# Patient Record
Sex: MALE | Race: WHITE
[De-identification: names, ages, dates, MRNs, and addresses within clinical notes are randomized per-mention and may not be internally consistent; named-entity substitution may affect disease eponyms.]

---

## 2020-08-03 ENCOUNTER — HOSPITAL ENCOUNTER (EMERGENCY)
Dept: HOSPITAL 11 - JP.ED | Age: 40
Discharge: OTHER INSTITUTION | End: 2020-08-03
Payer: COMMERCIAL

## 2020-08-03 DIAGNOSIS — K37: Primary | ICD-10-CM

## 2020-08-03 PROCEDURE — 96361 HYDRATE IV INFUSION ADD-ON: CPT

## 2020-08-03 PROCEDURE — 83690 ASSAY OF LIPASE: CPT

## 2020-08-03 PROCEDURE — 83605 ASSAY OF LACTIC ACID: CPT

## 2020-08-03 PROCEDURE — 85610 PROTHROMBIN TIME: CPT

## 2020-08-03 PROCEDURE — 96365 THER/PROPH/DIAG IV INF INIT: CPT

## 2020-08-03 PROCEDURE — 96376 TX/PRO/DX INJ SAME DRUG ADON: CPT

## 2020-08-03 PROCEDURE — 80053 COMPREHEN METABOLIC PANEL: CPT

## 2020-08-03 PROCEDURE — 96375 TX/PRO/DX INJ NEW DRUG ADDON: CPT

## 2020-08-03 PROCEDURE — 85027 COMPLETE CBC AUTOMATED: CPT

## 2020-08-03 PROCEDURE — 81001 URINALYSIS AUTO W/SCOPE: CPT

## 2020-08-03 PROCEDURE — 74177 CT ABD & PELVIS W/CONTRAST: CPT

## 2020-08-03 PROCEDURE — 36415 COLL VENOUS BLD VENIPUNCTURE: CPT

## 2020-08-03 PROCEDURE — 86901 BLOOD TYPING SEROLOGIC RH(D): CPT

## 2020-08-03 PROCEDURE — 86850 RBC ANTIBODY SCREEN: CPT

## 2020-08-03 PROCEDURE — 99285 EMERGENCY DEPT VISIT HI MDM: CPT

## 2020-08-03 PROCEDURE — 86900 BLOOD TYPING SEROLOGIC ABO: CPT

## 2020-08-03 PROCEDURE — 85651 RBC SED RATE NONAUTOMATED: CPT

## 2020-08-03 NOTE — CRLCT
INDICATION:



Ulcerative colitis, right lower quadrant pain  



TECHNIQUE:



CT abdomen and pelvis acquired with IV contrast. 124 cc Isovue-300 



COMPARISON:



None 



FINDINGS:



Lower chest: Unremarkable.  



Liver: Unremarkable.  



Spleen: Splenomegaly. 



Pancreas: Unremarkable.  



Gallbladder and bile ducts: Unremarkable.  



Kidneys: Unremarkable.  



Adrenal glands: Unremarkable.  



GI tract: Diffuse colonic fecal retention. The appendix is dilated up to 

8-9 millimeters, thick-walled and fluid-filled with adjacent inflammatory 

stranding. Findings consistent with acute appendicitis. 



Vascular structures: Unremarkable.  



Lymph nodes: Unremarkable.  



Miscellaneous: Unremarkable.  No free air or significant free fluid.  



Pelvic Organs: Unremarkable.  



Bones: Unremarkable for age.  



IMPRESSION:



Findings consistent with appendicitis. 



Splenomegaly.



Dictated by Jeyson Marley MD @ 8/3/2020 2:26:46 AM



Please note that all CT scans at this facility use dose modulation, 

iterative reconstruction, and/or weight-based dosing when appropriate to 

reduce radiation dose to as low as reasonably achievable.



Dictated by: Jeyson Marley MD @ 08/03/2020 02:27:01



(Electronically Signed)

## 2020-08-03 NOTE — EDM.PDOC
ED HPI GENERAL MEDICAL PROBLEM





- General


Chief Complaint: Abdominal Pain


Stated Complaint: ABD PAIN


Time Seen by Provider: 08/03/20 00:42


Source of Information: Reports: Patient


History Limitations: Reports: No Limitations





- History of Present Illness


Onset Date: 08/03/20


Onset Time: 18:00


Duration: Getting Worse


Quality: Reports: Stabbing (Right lower quadrant), Other (States this is nothing

like his usual ulcerative colitis pain)


Severity: Severe


  ** Right Lower Abdominal


Pain Score (Numeric/FACES): 8





- Related Data


                                    Allergies











Allergy/AdvReac Type Severity Reaction Status Date / Time


 


No Known Allergies Allergy   Verified 08/03/20 00:54











Home Meds: 


                                    Home Meds





NK [No Known Home Meds]  08/03/20 [History]











Past Medical History


Gastrointestinal History: Reports: Inflammatory Bowel Disease (Ulcerative 

colitis)





Social & Family History





- Living Situation & Occupation


Living situation: Reports:  (Here on vacation staying in a cabin with his

 wife and children.)





ED ROS GENERAL





- Review of Systems


Review Of Systems: See Below


Constitutional: Reports: Chills, Diaphoresis, Decreased Appetite


HEENT: Reports: No Symptoms


Respiratory: Reports: No Symptoms


Cardiovascular: Reports: No Symptoms


GI/Abdominal: Reports: Abdominal Pain, Bloody Stool, Diarrhea, Decreased 

Appetite, Nausea, Vomiting


Musculoskeletal: Reports: No Symptoms


Skin: Reports: No Symptoms





ED EXAM, GI/ABD





- Physical Exam


Exam: See Below


Text/Narrative:: 





Appears quite pale initially


Exam Limited By: No Limitations


General Appearance: Alert, WD/WN, Moderate Distress, Other (Observed to walk 

into the emergency department completely erect and not bent over in any 

distress)


Eyes: Bilateral: Normal Appearance, Pale Conjunctiva


Ears: Normal External Exam


Nose: Normal Inspection


Throat/Mouth: Normal Teeth, Normal Voice


Head: Atraumatic, Normocephalic


Neck: Normal Inspection, Supple


Respiratory/Chest: No Respiratory Distress, Lungs Clear


Cardiovascular: Normal Peripheral Pulses, Regular Rate, Rhythm, No Edema


GI/Abdominal Exam: No Organomegaly, No Distention, No Mass, Guarding (Right 

lower quadrant), Tender, Abnormal Bowel Sounds (Quiet bowel sounds)





Course





- Vital Signs


Text/Narrative:: 





Initial differential diagnosis: Ulcerative colitis flare, perforation, bowel 

obstruction, appendicitis, anemia.  The patient is initially treated with 1 L IV

 fluid bolus, Dilaudid, and Zofran. Notified at 0130 by nursing staff that 

Hgb=6+. @ 0233 we are in reciept of CT interpretation which shows acute 

appendicitis, no free air or free fluid.  At 0253 I discussed the patient with 

Dr. Condon from [facility] in [place].  He is excepted the patient in transfer there.








Last Recorded V/S: 


                                Last Vital Signs











Temp  36.7 C   08/03/20 01:01


 


Pulse  81   08/03/20 02:13


 


Resp  17   08/03/20 02:13


 


BP  134/65   08/03/20 02:13


 


Pulse Ox  100   08/03/20 02:13














- Orders/Labs/Meds


Orders: 


                               Active Orders 24 hr











 Category Date Time Status


 


 ANTIBODY IDENTIFICATION [BBK] Stat Lab  08/03/20 01:25 Results


 


 PATIENT RETYPE [BBK] Stat Lab  08/03/20 01:25 Results


 


 TYPE AND SCREEN [BBK] Stat Lab  08/03/20 01:25 Results


 


 UA W/MICROSCOPIC [URIN] Urgent Lab  08/03/20 01:13 Ordered


 


 Ampicillin/Sulbactam Na [Unasyn] 3 gm Med  08/03/20 02:46 Active





 Sodium Chloride 0.9% [Normal Saline] 100 ml   





 IV ONETIME   


 


 Sodium Chloride 0.9% [Normal Saline] 1,000 ml Med  08/03/20 02:45 Active





 IV ASDIRECTED   








                                Medication Orders





Sodium Chloride (Normal Saline)  1,000 mls @ 500 mls/hr IV ASDIRECTED EMMA


   Last Admin: 08/03/20 02:38  Dose: 500 mls/hr


   Documented by: VEGA


Ampicillin Sodium/Sulbactam (Sodium 3 gm/ Sodium Chloride)  100 mls @ 200 mls/hr

IV ONETIME ONE


   Stop: 08/03/20 03:15








Labs: 


                                Laboratory Tests











  08/03/20 08/03/20 08/03/20 Range/Units





  01:25 01:25 01:25 


 


WBC  16.4 H    (4.5-11.0)  K/uL


 


RBC  1.91 L    (4.30-5.90)  M/uL


 


Hgb  6.9 L*    (12.0-15.0)  g/dL


 


Hct  24.0 L    (40.0-54.0)  %


 


MCV  126 H    (80-98)  fL


 


MCH  36 H    (27-31)  pg


 


MCHC  29 L    (32-36)  %


 


Plt Count  351    (150-400)  K/uL


 


ESR     (0-20)  mm/hr


 


PT     (9.5-12.0)  sec


 


INR     (0.80-1.20)  


 


Sodium   137 L   (140-148)  mmol/L


 


Potassium   4.0   (3.6-5.2)  mmol/L


 


Chloride   101   (100-108)  mmol/L


 


Carbon Dioxide   21   (21-32)  mmol/L


 


Anion Gap   19.0 H   (5.0-14.0)  mmol/L


 


BUN   16   (7-18)  mg/dL


 


Creatinine   1.0   (0.8-1.3)  mg/dL


 


Est Cr Clr Drug Dosing   117.59   mL/min


 


Estimated GFR (MDRD)   > 60   (>60)  


 


Glucose   114 H   ()  mg/dL


 


Lactic Acid    1.7  (0.4-2.0)  mmol/L


 


Calcium   8.8   (8.5-10.1)  mg/dL


 


Total Bilirubin   4.7 H   (0.2-1.0)  mg/dL


 


AST   84 H   (15-37)  U/L


 


ALT   30   (12-78)  U/L


 


Alkaline Phosphatase   39 L   ()  U/L


 


Total Protein   7.6   (6.4-8.2)  g/dL


 


Albumin   4.5   (3.4-5.0)  g/dL


 


Globulin   3.1   (2.3-3.5)  g/dL


 


Albumin/Globulin Ratio   1.5   (1.2-2.2)  


 


Lipase     ()  U/L


 


Blood Type     


 


Gel Antibody Screen     














  08/03/20 08/03/20 08/03/20 Range/Units





  01:25 01:25 01:25 


 


WBC     (4.5-11.0)  K/uL


 


RBC     (4.30-5.90)  M/uL


 


Hgb     (12.0-15.0)  g/dL


 


Hct     (40.0-54.0)  %


 


MCV     (80-98)  fL


 


MCH     (27-31)  pg


 


MCHC     (32-36)  %


 


Plt Count     (150-400)  K/uL


 


ESR    108 H  (0-20)  mm/hr


 


PT   11.1   (9.5-12.0)  sec


 


INR   1.02   (0.80-1.20)  


 


Sodium     (140-148)  mmol/L


 


Potassium     (3.6-5.2)  mmol/L


 


Chloride     (100-108)  mmol/L


 


Carbon Dioxide     (21-32)  mmol/L


 


Anion Gap     (5.0-14.0)  mmol/L


 


BUN     (7-18)  mg/dL


 


Creatinine     (0.8-1.3)  mg/dL


 


Est Cr Clr Drug Dosing     mL/min


 


Estimated GFR (MDRD)     (>60)  


 


Glucose     ()  mg/dL


 


Lactic Acid     (0.4-2.0)  mmol/L


 


Calcium     (8.5-10.1)  mg/dL


 


Total Bilirubin     (0.2-1.0)  mg/dL


 


AST     (15-37)  U/L


 


ALT     (12-78)  U/L


 


Alkaline Phosphatase     ()  U/L


 


Total Protein     (6.4-8.2)  g/dL


 


Albumin     (3.4-5.0)  g/dL


 


Globulin     (2.3-3.5)  g/dL


 


Albumin/Globulin Ratio     (1.2-2.2)  


 


Lipase  131    ()  U/L


 


Blood Type     


 


Gel Antibody Screen     














  08/03/20 Range/Units





  01:25 


 


WBC   (4.5-11.0)  K/uL


 


RBC   (4.30-5.90)  M/uL


 


Hgb   (12.0-15.0)  g/dL


 


Hct   (40.0-54.0)  %


 


MCV   (80-98)  fL


 


MCH   (27-31)  pg


 


MCHC   (32-36)  %


 


Plt Count   (150-400)  K/uL


 


ESR   (0-20)  mm/hr


 


PT   (9.5-12.0)  sec


 


INR   (0.80-1.20)  


 


Sodium   (140-148)  mmol/L


 


Potassium   (3.6-5.2)  mmol/L


 


Chloride   (100-108)  mmol/L


 


Carbon Dioxide   (21-32)  mmol/L


 


Anion Gap   (5.0-14.0)  mmol/L


 


BUN   (7-18)  mg/dL


 


Creatinine   (0.8-1.3)  mg/dL


 


Est Cr Clr Drug Dosing   mL/min


 


Estimated GFR (MDRD)   (>60)  


 


Glucose   ()  mg/dL


 


Lactic Acid   (0.4-2.0)  mmol/L


 


Calcium   (8.5-10.1)  mg/dL


 


Total Bilirubin   (0.2-1.0)  mg/dL


 


AST   (15-37)  U/L


 


ALT   (12-78)  U/L


 


Alkaline Phosphatase   ()  U/L


 


Total Protein   (6.4-8.2)  g/dL


 


Albumin   (3.4-5.0)  g/dL


 


Globulin   (2.3-3.5)  g/dL


 


Albumin/Globulin Ratio   (1.2-2.2)  


 


Lipase   ()  U/L


 


Blood Type  B POSITIVE  


 


Gel Antibody Screen  Positive A*  











Meds: 


Medications











Generic Name Dose Route Start Last Admin





  Trade Name Kimi  PRN Reason Stop Dose Admin


 


Sodium Chloride  1,000 mls @ 500 mls/hr  08/03/20 02:45  08/03/20 02:38





  Normal Saline  IV   500 mls/hr





  ASDIRECTED EMMA   Administration


 


Ampicillin Sodium/Sulbactam  100 mls @ 200 mls/hr  08/03/20 02:46 





  Sodium 3 gm/ Sodium Chloride  IV  08/03/20 03:15 





  ONETIME ONE  














Discontinued Medications














Generic Name Dose Route Start Last Admin





  Trade Name Kimi  PRN Reason Stop Dose Admin


 


Hydromorphone HCl  0.5 mg  08/03/20 01:15  08/03/20 01:27





  Dilaudid  IVPUSH  08/03/20 01:16  0.5 mg





  ONETIME ONE   Administration


 


Hydromorphone HCl  1 mg  08/03/20 02:36 





  Dilaudid  IVPUSH  08/03/20 02:37 





  ONETIME ONE  


 


Sodium Chloride  1,000 mls @ 999 mls/hr  08/03/20 01:15  08/03/20 01:27





  Normal Saline  IV  08/03/20 02:15  999 mls/hr





  .BOLUS ONE   Administration


 


Sodium Chloride  78 mls @ 3.8 mls/sec  08/03/20 01:42  08/03/20 01:52





  Normal Saline  IV  08/03/20 01:43  3.8 mls/sec





  ASDIRECTED STA   Administration


 


Iopamidol  124 ml  08/03/20 01:42  08/03/20 01:52





  Isovue-300 (61%)  IV  08/03/20 01:43  150 ml





  .AS DIRECTED STA   Administration


 


Ondansetron HCl  4 mg  08/03/20 01:15  08/03/20 01:27





  Zofran  IVPUSH  08/03/20 01:16  4 mg





  ONETIME ONE   Administration














Departure





- Departure


Time of Disposition: 02:55


Disposition: DC/Tfer to Other 70


Condition: Serious


Clinical Impression: 


 Appendicitis








- Discharge Information


Referrals: 


PCP,None [Primary Care Provider] - 


Forms:  ED Department Discharge, Interfacility Transfer EMTALA





Sepsis Event Note (ED)





- Focused Exam


Vital Signs: 


                                   Vital Signs











  Temp Pulse Resp BP Pulse Ox


 


 08/03/20 02:13   81  17  134/65  100


 


 08/03/20 01:01  36.7 C  76  18  134/65  100


 


 08/03/20 01:00  36.7 C  76  18  134/65  100














- My Orders


Last 24 Hours: 


My Active Orders





08/03/20 01:13


UA W/MICROSCOPIC [URIN] Urgent 





08/03/20 01:25


ANTIBODY IDENTIFICATION [BBK] Stat 


PATIENT RETYPE [BBK] Stat 


TYPE AND SCREEN [BBK] Stat 





08/03/20 02:45


Sodium Chloride 0.9% [Normal Saline] 1,000 ml IV ASDIRECTED 





08/03/20 02:46


Ampicillin/Sulbactam Na [Unasyn] 3 gm   Sodium Chloride 0.9% [Normal Saline] 100

ml IV ONETIME 














- Assessment/Plan


Last 24 Hours: 


My Active Orders





08/03/20 01:13


UA W/MICROSCOPIC [URIN] Urgent 





08/03/20 01:25


ANTIBODY IDENTIFICATION [BBK] Stat 


PATIENT RETYPE [BBK] Stat 


TYPE AND SCREEN [BBK] Stat 





08/03/20 02:45


Sodium Chloride 0.9% [Normal Saline] 1,000 ml IV ASDIRECTED 





08/03/20 02:46


Ampicillin/Sulbactam Na [Unasyn] 3 gm   Sodium Chloride 0.9% [Normal Saline] 100

ml IV ONETIME

## 2020-08-05 ENCOUNTER — HOSPITAL ENCOUNTER (EMERGENCY)
Dept: HOSPITAL 11 - JP.ED | Age: 40
Discharge: HOME | End: 2020-08-05
Payer: COMMERCIAL

## 2020-08-05 DIAGNOSIS — G89.18: Primary | ICD-10-CM

## 2020-08-05 DIAGNOSIS — D59.9: ICD-10-CM

## 2020-08-05 NOTE — EDM.PDOC
ED HPI GENERAL MEDICAL PROBLEM





- General


Chief Complaint: General


Stated Complaint: ABDOMINAL PAIN


Time Seen by Provider: 08/05/20 18:03


Source of Information: Reports: Patient


History Limitations: Reports: No Limitations





- History of Present Illness


INITIAL COMMENTS - FREE TEXT/NARRATIVE: 





Patient presents for evaluation of right upper quadrant pain today since having 

an appendectomy yesterday, August 4, at [facility] in [place].  The 

appendicitis was diagnosed during an E.D. visit here and he was transferred for 

definitive surgery.  He has a history of ulcerative colitis and apparently also 

macrocytic anemia which he says was felt to be hemolytic in nature.  His 

hemoglobin was found to be below 7 and he was transfused 1 unit of packed cells 

while at [place] for his appendectomy.  He was discharged yesterday and returned

to this area, where he was camping with family.  Today he walked outside in 

[facility] but did not have any particularly strenuous day.  He noticed 

that when he walked, he would feel pain in the right upper quadrant and 

especially if he took a deep breath in.  If he pushes along the right upper 

quadrant rib margin, he says that he feels bubbly or crackly in that same area. 

No fever or chills.  He has been able to eat or drink and is not nauseated nor 

vomiting.  Additionally he has noticed air bubbles in his urine as a new finding

today.  He has no dysuria, urgency, hematuria.


Onset: Gradual


Duration: Day(s):


Location: Reports: Chest, Abdomen


Quality: Reports: Ache


Severity: Moderate


Improves with: Reports: None


Worsens with: Reports: Movement


Associated Symptoms: Denies: Cough, Loss of Appetite, Nausea/Vomiting





- Related Data


                                    Allergies











Allergy/AdvReac Type Severity Reaction Status Date / Time


 


No Known Allergies Allergy   Verified 08/03/20 00:54











Home Meds: 


                                    Home Meds





NK [No Known Home Meds]  08/03/20 [History]











Past Medical History


Gastrointestinal History: Reports: Inflammatory Bowel Disease


Other Gastrointestinal History: ulcerative colitis


Hematologic History: Reports: Anemia





- Infectious Disease History


Infectious Disease History: Reports: Chicken Pox, Influenza





- Past Surgical History


GI Surgical History: Reports: Appendectomy





Social & Family History





- Tobacco Use


Smoking Status *Q: Never Smoker





- Caffeine Use


Caffeine Use: Reports: None





- Recreational Drug Use


Recreational Drug Use: No





- Living Situation & Occupation


Living situation: Reports:  (Here on vacation staying in a cabin with his

 wife and children.)





ED ROS GENERAL





- Review of Systems


Review Of Systems: See Below


Constitutional: Reports: No Symptoms


GI/Abdominal: Reports: Abdominal Pain (Mild), Bloody Stool (He typically notices

 some blood in stools secondary to his ulcerative colitis.).  Denies: 

Constipation, Diarrhea





ED EXAM, GENERAL





- Physical Exam


Exam: See Below


Free Text/Narrative:: 





This is a composed adult male in room 5.  Vital signs are unremarkable.


Exam Limited By: No Limitations


General Appearance: Alert, No Apparent Distress


Respiratory/Chest: No Respiratory Distress, Lungs Clear


Cardiovascular: Regular Rate, Rhythm


GI/Abdominal: Soft, No Distention, Tender (Minimal generalized abdominal 

tenderness, in particular near the laparoscopic incision ports.  No discharge 

from any of the incisions.  Bowel sounds are present.).  No: Distended, 

Guarding, Rigid, Abnormal Bowel Sounds





Course





- Vital Signs


Last Recorded V/S: 


                                Last Vital Signs











Temp  36.4 C   08/05/20 17:58


 


Pulse  78   08/05/20 20:04


 


Resp  16   08/05/20 20:04


 


BP  122/64   08/05/20 20:04


 


Pulse Ox  100   08/05/20 20:04














- Orders/Labs/Meds


Orders: 


                               Active Orders 24 hr











 Category Date Time Status


 


 Abdomen 1V Upright [CR] Stat Exams  08/05/20 18:25 Taken











Labs: 


                                Laboratory Tests











  08/05/20 08/05/20 Range/Units





  18:38 18:38 


 


WBC  8.0   (4.5-11.0)  K/uL


 


RBC  1.78 L   (4.30-5.90)  M/uL


 


Hgb  6.2 L*   (12.0-15.0)  g/dL


 


Hct  21.5 L   (40.0-54.0)  %


 


MCV  121 H   (80-98)  fL


 


MCH  35 H   (27-31)  pg


 


MCHC  29 L   (32-36)  %


 


Plt Count  295   (150-400)  K/uL


 


Neut % (Auto)  77 H   (36-66)  %


 


Lymph % (Auto)  15 L   (24-44)  %


 


Mono % (Auto)  7 H   (2-6)  %


 


Eos % (Auto)  1 L   (2-4)  %


 


Baso % (Auto)  0   (0-1)  %


 


Sodium   140  (140-148)  mmol/L


 


Potassium   4.0  (3.6-5.2)  mmol/L


 


Chloride   105  (100-108)  mmol/L


 


Carbon Dioxide   27  (21-32)  mmol/L


 


Anion Gap   8.2  (5.0-14.0)  mmol/L


 


BUN   18  (7-18)  mg/dL


 


Creatinine   1.1  (0.8-1.3)  mg/dL


 


Est Cr Clr Drug Dosing   106.90  mL/min


 


Estimated GFR (MDRD)   > 60  (>60)  


 


Glucose   98  ()  mg/dL


 


Calcium   8.6  (8.5-10.1)  mg/dL


 


Total Bilirubin   3.2 H  (0.2-1.0)  mg/dL


 


AST   43 H  (15-37)  U/L


 


ALT   24  (12-78)  U/L


 


Alkaline Phosphatase   41 L  ()  U/L


 


Total Protein   6.4  (6.4-8.2)  g/dL


 


Albumin   3.6  (3.4-5.0)  g/dL


 


Globulin   2.8  (2.3-3.5)  g/dL


 


Albumin/Globulin Ratio   1.3  (1.2-2.2)  














- Re-Assessments/Exams


Free Text/Narrative Re-Assessment/Exam: 





08/05/20 23:06


He is concerned about his right upper quadrant discomfort as the main thing.  On

 further questioning about "air in his urine" what he describes is noticing more

 bubbles in the water of the toilet while urinating and after.  There is not any

 air noticed with in his urine stream as far as he can tell.  His generally 

persistent blood loss in stools is related to his treatment refractory 

ulcerative colitis, having failed many medications including Biologics.  I 

discussed that he may have residual air under his diaphragm based on recent 

surgery.  I will look with an upright abdomen x-ray and also double check his 

labs.  He states that he was evaluated by a hematologist while in the hospital 

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 

on.  Further outpatient work-up is anticipated once he returns home to [place] 

[place].


08/05/20 23:08


I returned later to show him pictures of his upright abdomen x-ray.  There is 

subdiaphragmatic air on both sides but atypical amount that I would expect being

 24 hours postop.  His hemoglobin is 6.2 although his white count is normal.  He

 does have macrocytosis.  His bilirubin is 3.2 and liver enzymes are minimally 

elevated.  I reviewed his case with Dr. Vallecillo at [facility] in [place].  He 

was the one who performed his surgery.  He feels the amount of air I described 

is appropriate for this time and is postop..  If urinary symptoms worsen and it 

is actually felt that he truly has air in his urine it could be because of 

injury to the bladder but he is confident that no injury occurred during the 

procedure.  Patient will be returning home to [place] tomorrow, Thursday.  

He plans to stop by the clinic near his home.  Although his hemoglobin is 6.2, 

slightly lower than his discharge hemoglobin from [place], he prefers to follow-

up at home.  He had a number of antibodies noted in his type and crossmatch and 

if he requires transfusion, it may be difficult to find appropriate matched 

blood and smaller community.  He was discharged in stable condition.  Reasons to

 return to emergency department reviewed.





Departure





- Departure


Time of Disposition: 19:53


Disposition: Home, Self-Care 01


Clinical Impression: 


 Post-operative pain





Anemia


Qualifiers:


 Anemia type: acquired or hereditary hemolytic anemia Hemolytic anemia type: 

acquired, unspecified Qualified Code(s): D59.9 - Acquired hemolytic anemia, 

unspecified








- Discharge Information


Instructions:  Pain Relief Before and After Surgery


Referrals: 


PCP,None [Primary Care Provider] - 


Forms:  ED Department Discharge


Additional Instructions: 


You have subdiaphragmatic air from your surgery. That is why you have pressure 

under the ribs on the right side. The air (carbon dioxide) will gradually get 

reabsorbed by the body over the next day or two. If you actually have air 

bubbles in the urine, it can come from bladder injury. It is very unlikely 

according to your surgeon but if the bladder was injured you would get sicker 

over the next few days. Keep your plans to have labs drawn tomorrow in 

[place]. Your hemoglobin may be lower yet tomorrow but follow up with your 

local doctors. If you need blood, it may take longer to get it because of your 

antibodies. Return to ER if feeling worse.





Sepsis Event Note (ED)





- Evaluation


Sepsis Screening Result: No Definite Risk





- Focused Exam


Vital Signs: 


                                   Vital Signs











  Temp Pulse Resp BP Pulse Ox


 


 08/05/20 20:04   78  16  122/64  100


 


 08/05/20 17:58  36.4 C  92  12  125/52 L  100


 


 08/05/20 17:49  36.4 C  92  12  125/52 L  100














- My Orders


Last 24 Hours: 


My Active Orders





08/05/20 18:25


Abdomen 1V Upright [CR] Stat 














- Assessment/Plan


Last 24 Hours: 


My Active Orders





08/05/20 18:25


Abdomen 1V Upright [CR] Stat

## 2020-08-06 NOTE — CR
Abdomen 1V Upright

 

CLINICAL HISTORY: Status post appendectomy with right upper quadrant pain and 

pneumaturia

 

FINDINGS: There is free intraperitoneal air under both hemidiaphragms. This may

be related to recent appendectomy. There is a small amount of air in the left

pelvis with an air-fluid level. This may be within the bladder. Small intestinal

gas pattern is nonacute. There is moderate fecal retention in the right colon

 

IMPRESSION: Moderate free air may be postoperative

 

Moderate fecal retention

 

Air in the left mid pelvis may be within the bladder